# Patient Record
Sex: FEMALE | Race: WHITE
[De-identification: names, ages, dates, MRNs, and addresses within clinical notes are randomized per-mention and may not be internally consistent; named-entity substitution may affect disease eponyms.]

---

## 2018-02-19 ENCOUNTER — HOSPITAL ENCOUNTER (OUTPATIENT)
Dept: HOSPITAL 25 - OREAST | Age: 48
Discharge: HOME | End: 2018-02-19
Attending: ORTHOPAEDIC SURGERY
Payer: COMMERCIAL

## 2018-02-19 VITALS — DIASTOLIC BLOOD PRESSURE: 60 MMHG | SYSTOLIC BLOOD PRESSURE: 102 MMHG

## 2018-02-19 DIAGNOSIS — M75.01: Primary | ICD-10-CM

## 2018-02-19 DIAGNOSIS — K21.9: ICD-10-CM

## 2018-02-19 DIAGNOSIS — J45.909: ICD-10-CM

## 2018-02-19 DIAGNOSIS — E03.9: ICD-10-CM

## 2018-02-19 DIAGNOSIS — G89.18: ICD-10-CM

## 2018-02-19 PROCEDURE — 81025 URINE PREGNANCY TEST: CPT

## 2018-02-23 NOTE — OP
DATE OF OPERATION:  02/19/18 - [facility]

 

DATE OF BIRTH:  [birth year]

 

SURGEON:  Tommy Segovia MD

 

ASSISTANT:  JULIANN Acosta.  An assistant was needed for the entirety of 
the case to help with positioning, retraction, and was utilized throughout all 
portions of the case including manipulation.

 

ANESTHESIOLOGIST:  Aries Guadarrama MD

 

ANESTHESIA:  General interscalene block.

 

PRE-OP DIAGNOSIS:  Right shoulder adhesive capsulitis status post rotator cuff 
repair.

 

POST-OP DIAGNOSIS:  Right shoulder adhesive capsulitis status post rotator cuff 
repair.

 

OPERATIVE PROCEDURE:

1.  Right shoulder lysis of adhesion.

2.  Revision, decompression, bursectomy of subacromial space.

3.  Manipulation under anesthesia.

4.  Intraarticular injection with 80 mg of Depo-Medrol.

 

INDICATIONS:  Masha Diaz is a 47-year-old female, who underwent a 
rotator cuff repair on 11/21/16 along with biceps tenotomy.  She had made gains 
with her motion and then plateaued.  We tried antiinflammatories, injections, 
physical therapy to no avail.  Risks and benefits of surgery were discussed at 
length, included but not limited to, bleeding; infection; damage to nerves, 
vessels, surrounding structures; wound nonhealing; persistent pain; need for 
further surgery; scarring; stiffness; incomplete relief of symptoms; risks of 
anesthesia; fracture; risk of DVT.  She has elected to proceed.

 

COMPLICATIONS:  None.

 

ESTIMATED BLOOD LOSS:  Minimal.

 

DESCRIPTION OF PROCEDURE:  The patient was greeted in the preoperative area by 
the attending surgeon.  Correct extremity was marked, consent was confirmed.  
The patient was then brought back to the operating suite, where she was placed 
in the supine position on the operating table.  She underwent interscalene 
nerve block by the anesthesiologist, after which she underwent general 
anesthesia with endotracheal intubation, after which she was placed in left 
lateral decubitus position and all bony prominences were padded.  The right 
shoulder was draped unsterile with 10 pounds of traction.  The right shoulder 
was prepped and draped in the usual sterile fashion beginning with 
chlorhexidine soap, scrub, and alcohol wipe, and a final prep with ChloraPrep.

 

After appropriate surgical pause indicating side, site, procedure, and 
administration of antibiotics, the standard posterior lateral portal was made 
sharply with a 11 blade.  Scope was introduced into the joint.  The 
glenohumeral joint had some mild chondrosis that was present.  The superior 
labrum was intact. Anterior posterior labrum had mild fraying.  There was a 
thick shelf of tissue obscuring the supraspinatus tendon.  The interval space 
had significant amounts of thickening and capsulitis such that it was difficult 
to get the anterior portal, and once this was established though the 
electrocautery device was used to remove and release them at the interval space
, this allowed for some mobilization of the shoulder joint.  Once this was done 
with careful blunt and electrocautery dissection, the anterior capsule was 
carefully released under direct visualization. The subscap was then carefully 
mobilized.  Internal and external rotation was obtained and it was found to be 
more mobile. The subscap was identified and found to be intact.  The 
undersurface of the rotator cuff was found to be intact as well. Once the 
intraarticular portion was completed, attention was directed towards the 
subacromial space.

 

The scope was introduced into the subacromial space.  Lateral portal was made 
in an outside-in fashion.  There was abundant thick bursa that was present.  
This was removed using a shaver.  There was also evidence of adhesions in the 
subdeltoid space, particularly anteriorly and posteriorly.  These were 
carefully released with care to preserve the rotator cuff.  The sutures were 
identified.  There was no evidence of tearing. There were no loose bodies. Once 
the debridement was complete, the final images were obtained.  An 18-gauge 
needle was placed intraarticularly under arthroscopic visualization.  The 
wounds were copiously irrigated and closed with 3-0 nylon.  The joint was 
intraarticularly injected with 80 mg of Depo-Medrol.  Sterile dressings were 
applied.  At this point, the patient was kept in lateral position and gentle 
manipulation was done.  Preoperatively, her forward flexion was found to be 
about 145 with gentle manipulation and was found to get to 150 degrees forward 
flexion, abduction to 150 degrees, external rotation initially preoperatively 
was to about 10 or 15 degrees and intraoperatively, it manipulated to 70 
degrees.  Sterile dressings were applied as well as a Cryo/Cuff and a regular 
sling.  She was awoken from anesthesia and was transferred to the PACU in 
stable condition.

 

POSTOPERATIVE PLAN:  She will be nonweightbearing. She will start physical 
therapy tomorrow. She will be discharged on pain medication and antibiotics.  I 
will see the patient back in 10 to 14 days.

 

 [id number]/[facility] #: [id number]

YUMIKO

## 2019-11-05 ENCOUNTER — HOSPITAL ENCOUNTER (EMERGENCY)
Dept: HOSPITAL 25 - ED | Age: 49
Discharge: HOME | End: 2019-11-05
Payer: COMMERCIAL

## 2019-11-05 VITALS — DIASTOLIC BLOOD PRESSURE: 64 MMHG | SYSTOLIC BLOOD PRESSURE: 114 MMHG

## 2019-11-05 DIAGNOSIS — J45.909: ICD-10-CM

## 2019-11-05 DIAGNOSIS — Z79.899: ICD-10-CM

## 2019-11-05 DIAGNOSIS — K80.50: Primary | ICD-10-CM

## 2019-11-05 DIAGNOSIS — E03.9: ICD-10-CM

## 2019-11-05 DIAGNOSIS — K21.9: ICD-10-CM

## 2019-11-05 DIAGNOSIS — K44.9: ICD-10-CM

## 2019-11-05 DIAGNOSIS — R10.11: ICD-10-CM

## 2019-11-05 LAB
ALBUMIN SERPL BCG-MCNC: 4.4 G/DL (ref 3.2–5.2)
ALBUMIN/GLOB SERPL: 1.5 {RATIO} (ref 1–3)
ALP SERPL-CCNC: 90 U/L (ref 34–104)
ALT SERPL W P-5'-P-CCNC: 18 U/L (ref 7–52)
ANION GAP SERPL CALC-SCNC: 7 MMOL/L (ref 2–11)
AST SERPL-CCNC: 17 U/L (ref 13–39)
BASOPHILS # BLD AUTO: 0.1 10^3/UL (ref 0–0.2)
BUN SERPL-MCNC: 13 MG/DL (ref 6–24)
BUN/CREAT SERPL: 14.6 (ref 8–20)
CALCIUM SERPL-MCNC: 9.6 MG/DL (ref 8.6–10.3)
CHLORIDE SERPL-SCNC: 105 MMOL/L (ref 101–111)
EOSINOPHIL # BLD AUTO: 0.2 10^3/UL (ref 0–0.6)
GLOBULIN SER CALC-MCNC: 2.9 G/DL (ref 2–4)
GLUCOSE SERPL-MCNC: 93 MG/DL (ref 70–100)
HCO3 SERPL-SCNC: 26 MMOL/L (ref 22–32)
HCT VFR BLD AUTO: 39 % (ref 35–47)
HGB BLD-MCNC: 13.7 G/DL (ref 12–16)
INR PPP/BLD: 1 (ref 0.82–1.09)
LYMPHOCYTES # BLD AUTO: 1.6 10^3/UL (ref 1–4.8)
MCH RBC QN AUTO: 32 PG (ref 27–31)
MCHC RBC AUTO-ENTMCNC: 35 G/DL (ref 31–36)
MCV RBC AUTO: 90 FL (ref 80–97)
MONOCYTES # BLD AUTO: 0.4 10^3/UL (ref 0–0.8)
NEUTROPHILS # BLD AUTO: 3.4 10^3/UL (ref 1.5–7.7)
NRBC # BLD AUTO: 0 10^3/UL
NRBC BLD QL AUTO: 0.1
PLATELET # BLD AUTO: 250 10^3/UL (ref 150–450)
POTASSIUM SERPL-SCNC: 3.8 MMOL/L (ref 3.5–5)
PROT SERPL-MCNC: 7.3 G/DL (ref 6.4–8.9)
RBC # BLD AUTO: 4.32 10^6 /UL (ref 3.7–4.87)
SODIUM SERPL-SCNC: 138 MMOL/L (ref 135–145)
TROPONIN I SERPL-MCNC: 0 NG/ML (ref ?–0.04)
WBC # BLD AUTO: 5.7 10^3/UL (ref 3.5–10.8)

## 2019-11-05 PROCEDURE — 36415 COLL VENOUS BLD VENIPUNCTURE: CPT

## 2019-11-05 PROCEDURE — 83690 ASSAY OF LIPASE: CPT

## 2019-11-05 PROCEDURE — 80053 COMPREHEN METABOLIC PANEL: CPT

## 2019-11-05 PROCEDURE — 93005 ELECTROCARDIOGRAM TRACING: CPT

## 2019-11-05 PROCEDURE — 76705 ECHO EXAM OF ABDOMEN: CPT

## 2019-11-05 PROCEDURE — 86140 C-REACTIVE PROTEIN: CPT

## 2019-11-05 PROCEDURE — 99282 EMERGENCY DEPT VISIT SF MDM: CPT

## 2019-11-05 PROCEDURE — 84484 ASSAY OF TROPONIN QUANT: CPT

## 2019-11-05 PROCEDURE — 85610 PROTHROMBIN TIME: CPT

## 2019-11-05 PROCEDURE — 85025 COMPLETE CBC W/AUTO DIFF WBC: CPT

## 2019-11-05 NOTE — ED
Abdominal Pain/Female





- HPI Summary


HPI Summary: 


This patient is a 48-year-old female who presents to the ED with right upper 

quadrant pain radiating to the right shoulder into the epigastric region 2 

days.  She states she has had this 3 years ago and was diagnosed with biliary 

colic.  She had seen a surgeon as well as a GI specialist.  She opted not to 

have surgery at the time as her symptoms resolved spontaneously.  Over the past 

2 days, she has been endorsing nausea without vomiting, right upper quadrant 

pain which is worse after eating, specifically with fatty foods, resolves 

spontaneously after 1-2 hours and improves with decreased PO intake.  She 

states however she does have some pain even with water.  She continues on her 

diclofenac medication for her right shoulder and Nexium for her GERD sxs and 

she does have a history of hiatal hernia.  Recent endoscopy 3 months ago 

revealed no acute findings.  





- History of Current Complaint


Chief Complaint: EDAbdPain


Stated Complaint: ABD PAIN/BACK/CHEST PAIN PER PT


Time Seen by Provider: 11/05/19 11:30


Hx Obtained From: Patient


Hx Last Menstrual Period: Ablation, doesn't have periods


Pregnant?: No


Onset/Duration: Sudden Onset


Timing: Constant


Severity Initially: Moderate


Severity Currently: Mild


Pain Intensity: 2


Pain Scale Used: 0-10 Numeric


Location: Discrete At: RUQ


Radiates: No


Radiates to: Other - upper shoulder and epigastric region


Character: Cramping


Aggravating Factor(s): Food


Alleviating Factor(s): Spontaneous Resolution


Associated Signs and Symptoms: Negative: Diaphoresis, Fever, Cough, Chest Pain, 

Blood in Stool, Urinary Symptoms, Decreased Appetite





- Risk Factors


Ectopic Pregnancy Risk Factor: Negative


Ovarian Torsion Risk Factor: Negative


Allergies/Adverse Reactions: 


 Allergies











Allergy/AdvReac Type Severity Reaction Status Date / Time


 


gatifloxacin [From Tequin] Allergy  hypoglycemi Verified 02/19/18 12:50





   a  


 


montelukast [From Singulair] Allergy  Hives Verified 02/19/18 12:50


 


Penicillins Allergy  Tachycardia Verified 02/19/18 12:50


 


Sulfa (Sulfonamide Allergy  passed out Verified 02/19/18 12:50





Antibiotics)     


 


ENVIRONMENTAL/SEASONAL Allergy  ITCHY Uncoded 02/19/18 12:50





   WATERY  





   EYES,  





   SNEEZING,  





   CONGESTION,  





   RUNNY NOSE  


 


SOME ADHESIVE TAPE Allergy  RASH - Uncoded 02/19/18 12:50





   UNSURE  





   WHICH ONES  











Home Medications: 


 Home Medications





Cholecalciferol TAB* [Vitamin D TAB*] 1,000 unit PO DAILY 11/05/19 [History 

Confirmed 11/05/19]











PMH/Surg Hx/FS Hx/Imm Hx


Previously Healthy: Yes


Endocrine/Hematology History: Reports: Hx Thyroid Disease - THYROIDECTOMY for 

graves disease


   Denies: Hx Diabetes, Hx Systemic Lupus Erythematosus


Cardiovascular History: 


   Denies: Hx Congestive Heart Failure, Hx Hypertension, Hx Pacemaker/ICD, 

Other Cardiovascular Problems/Disorders


Respiratory History: Reports: Hx Asthma - R/T ALLERGY


   Denies: Hx Chronic Obstructive Pulmonary Disease (COPD), Other Respiratory 

Problems/Disorders


GI History: Reports: Hx Gastroesophageal Reflux Disease, Hx Hiatal Hernia


   Denies: Hx Ulcer, Other GI Disorders


 History: Reports: Hx Kidney Infection, Other  Problems/Disorders - HX OF 

BLADDER LIFT


   Denies: Hx Dialysis, Hx Renal Disease


Musculoskeletal History: Reports: Hx Tendonitis - HX OF IN RIGHT SHOULDER


   Denies: Hx Rheumatoid Arthritis


Sensory History: Reports: Hx Contacts or Glasses - glasses


   Denies: Hx Hearing Aid


Opthamlomology History: Reports: Hx Contacts or Glasses - glasses


Neurological History: Reports: Hx Migraine


   Denies: Other Neuro Impairments/Disorders


Psychiatric History: 


   Denies: Hx Panic Disorder





- Cancer History


Hx Chemotherapy: No


Hx Radiation Therapy: No





- Surgical History


Surgery Procedure, Year, and Place: 1994 CSECTION, [facility].  1997 BILATERAL TUBAL 

LIGATION, [facility] AND UTERINE ABLATION.  [place] [facility] [place] 

[place] [facility] [place]  [place]ECTOMY ( GRAVES 

DISE[place] [facility] [place]  [place], 2016[place] [facility]  2016, left knee, [facility].  2008 

EXCISION HEMANGIOMA [place] [facility] [place]  2011 DILATION, CURETTAGE, 

HYSTEROSCOPY, NOVASURE [place] [facility] [place].  2015 VAGINAL SLING, CYSTOSCOPY (BLADDER 

L[place] [facility] [place]  [place]2016 LEFT KNEE MENISCUS REPAIR, [facility].  11/2016 - Rt RCT REPAIR


Hx Anesthesia Reactions: Yes - n/v, scapalomine patch works





- Immunization History


Hx Pertussis Vaccination: No


Immunizations Up to Date: Yes


Infectious Disease History: No


Infectious Disease History: 


   Denies: Hx Hepatitis, Hx Human Immunodeficiency Virus (HIV), Traveled 

Outside the  in Last 30 Days





- Social History


Occupation: Employed Full-time


Lives: With Family


Alcohol Use: None


Alcohol Amount: 1-2 per year


Hx Substance Use: No


Substance Use Type: Reports: None


Smoking Status (MU): Never Smoked Tobacco





Review of Systems


Negative: Fever, Chills, Fatigue, Skin Diaphoresis


Negative: Palpitations, Chest Pain


Positive: Abdominal Pain - right upper quadrant


Genitourinary: Negative


Positive: no symptoms reported, see HPI


Negative: Arthralgia, Myalgia


Neurological: Negative


All Other Systems Reviewed And Are Negative: Yes





Physical Exam


Triage Information Reviewed: Yes


Vital Signs On Initial Exam: 


 Initial Vitals











Temp Pulse Resp BP Pulse Ox


 


 97.8 F   67   18   138/68   99 


 


 11/05/19 11:25  11/05/19 11:25  11/05/19 11:25  11/05/19 11:25  11/05/19 11:25











Vital Signs Reviewed: Yes


Appearance: Positive: Well-Appearing, Well-Nourished


Skin: Positive: Warm, Skin Color Reflects Adequate Perfusion


Head/Face: Positive: Normal Head/Face Inspection


Eyes: Positive: EOMI, NINI, Conjunctiva Clear


Neck: Positive: Supple, No Lymphadenopathy


Respiratory/Lung Sounds: Positive: Clear to Auscultation, Breath Sounds Present


Cardiovascular: Positive: RRR, Pulses are Symmetrical in both Upper and Lower 

Extremities


Abdomen Description: Positive: Nontender, No Organomegaly, Soft, Other: - +

muprhys sign


Bowel Sounds: Positive: Present


Musculoskeletal: Positive: Normal, Strength/ROM Intact


Neurological: Positive: Speech Normal


Psychiatric: Positive: Affect/Mood Appropriate


AVPU Assessment: Alert





Procedures





- Sedation


Patient Received Moderate/Deep Sedation with Procedure: No





Diagnostics





- Vital Signs


 Vital Signs











  Temp Pulse Resp BP Pulse Ox


 


 11/05/19 13:01  98.7 F  66  15  114/64  94


 


 11/05/19 12:11   62  14  114/64  99


 


 11/05/19 11:25  97.8 F  67  18  138/68  99














- Laboratory


Lab Results: 


 Lab Results











  11/05/19 11/05/19 11/05/19 Range/Units





  11:29 11:29 11:29 


 


WBC  5.7    (3.5-10.8)  10^3/uL


 


RBC  4.32    (3.70-4.87)  10^6 /uL


 


Hgb  13.7    (12.0-16.0)  g/dL


 


Hct  39    (35-47)  %


 


MCV  90    (80-97)  fL


 


MCH  32 H    (27-31)  pg


 


MCHC  35    (31-36)  g/dL


 


RDW  13    (10-15)  %


 


Plt Count  250    (150-450)  10^3/uL


 


MPV  8.3    (7.4-10.4)  fL


 


Neut % (Auto)  60.4    %


 


Lymph % (Auto)  27.5    %


 


Mono % (Auto)  6.5    %


 


Eos % (Auto)  4.4    %


 


Baso % (Auto)  1.2    %


 


Absolute Neuts (auto)  3.4    (1.5-7.7)  10^3/ul


 


Absolute Lymphs (auto)  1.6    (1.0-4.8)  10^3/ul


 


Absolute Monos (auto)  0.4    (0-0.8)  10^3/ul


 


Absolute Eos (auto)  0.2    (0-0.6)  10^3/ul


 


Absolute Basos (auto)  0.1    (0-0.2)  10^3/ul


 


Absolute Nucleated RBC  0.0    10^3/ul


 


Nucleated RBC %  0.1    


 


INR (Anticoag Therapy)   1.00   (0.82-1.09)  


 


Sodium    138  (135-145)  mmol/L


 


Potassium    3.8  (3.5-5.0)  mmol/L


 


Chloride    105  (101-111)  mmol/L


 


Carbon Dioxide    26  (22-32)  mmol/L


 


Anion Gap    7  (2-11)  mmol/L


 


BUN    13  (6-24)  mg/dL


 


Creatinine    0.89  (0.51-0.95)  mg/dL


 


Est GFR ( Amer)    82.3  (>60)  


 


Est GFR (Non-Af Amer)    68.0  (>60)  


 


BUN/Creatinine Ratio    14.6  (8-20)  


 


Glucose    93  ()  mg/dL


 


Calcium    9.6  (8.6-10.3)  mg/dL


 


Total Bilirubin    0.50  (0.2-1.0)  mg/dL


 


AST    17  (13-39)  U/L


 


ALT    18  (7-52)  U/L


 


Alkaline Phosphatase    90  ()  U/L


 


Troponin I    0.00  (<0.04)  ng/mL


 


C-Reactive Protein    9.14 H  (<8.01)  mg/L


 


Total Protein    7.3  (6.4-8.9)  g/dL


 


Albumin    4.4  (3.2-5.2)  g/dL


 


Globulin    2.9  (2-4)  g/dL


 


Albumin/Globulin Ratio    1.5  (1-3)  


 


Lipase    18  (11.0-82.0)  U/L











Result Diagrams: 


 11/05/19 11:29





 11/05/19 11:29


Lab Statement: Any lab studies that have been ordered have been reviewed, and 

results considered in the medical decision making process.





Abdominal Pain Fem Course/Dx





- Course


Course Of Treatment: Physical examination, patient has positive Hanna sign.  

No other pain throughout the abdomen.  No CVA tenderness bilaterally.  Pt 

states she has had this in the past and states her US was negative.  Has not 

had sxs x 3 years.  Labs obtained which are WNL.  US gallbladder: 

Cholelithiasis versus biliary sludge without sonographic signs of acute 

inflammatory change or pathologic biliary obstruction.  Possible hepatic 

steatosis.  Pt improved while in the ED and currently asymptomatic.  Patient is 

given Zofran and tramadol for at home for any symptoms.  She is encouraged to 

decrease any fat intake and eat a bland diet.  She will follow-up with surgery.

  Dx with biliary colic, although on the differential is GERD.





- Diagnoses


Differential Diagnosis: Positive: Other - biliary colic, biliary sludge, 

cholilithiasis, gallbladder dysfunction, GERD


Provider Diagnoses: 


 Biliary colic








Discharge ED





- Sign-Out/Discharge


Documenting (check all that apply): Patient Departure





- Discharge Plan


Condition: Stable


Disposition: HOME


Prescriptions: 


Ondansetron ODT TAB* [Zofran 4 MG Odt TAB*] 4 mg PO Q6H PRN #12 tab.odt MDD 4


 PRN Reason: Nausea


traMADol TAB* [Ultram*] 50 mg PO Q8H PRN #12 tab MDD 3


 PRN Reason: Pain


Patient Education Materials:  Biliary Colic (ED), Low Fat Diet (ED), 

Laparoscopic Cholecystectomy (DC)


Referrals: 


Agustin Gambino MD [Primary Care Provider] - 


Karen Henson MD [Medical Doctor] - 


Additional Instructions: 


Please follow up with surgery - call today to make an appt


Eat low fat diet and small amounts at a time


For any nausea, zofran as needed


For pain, tramadol 50mg up to three times daily as needed for pain





- Billing Disposition and Condition


Condition: STABLE


Disposition: Home





- Attestation Statements


Provider Attestation: 





 I was available for consultation for this patient. I did not evaluate the 

patient or participate in any medical decision making or disposition decisions 

unless I am specifically named in the chart as having consulted on the patient. 

If I have consulted on the patient, please see my own ED note on the patient 

encounter. Dennis Westfall MD

## 2019-12-13 ENCOUNTER — HOSPITAL ENCOUNTER (OUTPATIENT)
Dept: HOSPITAL 25 - OR | Age: 49
Discharge: HOME | End: 2019-12-13
Attending: SURGERY
Payer: COMMERCIAL

## 2019-12-13 VITALS — DIASTOLIC BLOOD PRESSURE: 62 MMHG | SYSTOLIC BLOOD PRESSURE: 110 MMHG

## 2019-12-13 DIAGNOSIS — Z88.8: ICD-10-CM

## 2019-12-13 DIAGNOSIS — K80.10: Primary | ICD-10-CM

## 2019-12-13 DIAGNOSIS — Z88.2: ICD-10-CM

## 2019-12-13 DIAGNOSIS — E89.0: ICD-10-CM

## 2019-12-13 DIAGNOSIS — R10.11: ICD-10-CM

## 2019-12-13 DIAGNOSIS — K21.9: ICD-10-CM

## 2019-12-13 DIAGNOSIS — Z88.0: ICD-10-CM

## 2019-12-13 PROCEDURE — 0FT44ZZ RESECTION OF GALLBLADDER, PERCUTANEOUS ENDOSCOPIC APPROACH: ICD-10-PCS | Performed by: SURGERY

## 2019-12-13 PROCEDURE — 88304 TISSUE EXAM BY PATHOLOGIST: CPT

## 2019-12-13 NOTE — OP
DATE OF OPERATION:  12/13/19 - [facility]

 

DATE OF BIRTH:  [birth year]

 

SURGEON:  Arthur Rain MD

 

ASSISTANT:  Harry Herman MD

 

ANESTHESIOLOGIST:  Dr. Villanueva.

 

ANESTHESIA:  General with local.

 

PRE-OP DIAGNOSES:  Right upper quadrant abdominal pain and cholelithiasis.

 

POST-OP DIAGNOSES:  Right upper quadrant abdominal pain and cholelithiasis.

 

OPERATIVE PROCEDURE:  Laparoscopic cholecystectomy.

 

ESTIMATED BLOOD LOSS:  Minimal.

 

IV FLUIDS:  1 L of crystalloid.

 

SPECIMENS:  Gallbladder.

 

DRAINS:  None.

 

COMPLICATIONS:  None.

 

WOUND CLASSIFICATION:  3.

 

FINDINGS:  No evidence of acute or chronic gallbladder inflammation, otherwise 
as above.

 

DESCRIPTION OF PROCEDURE:  Written informed consent was obtained, the abdomen 
was marked with indelible ink.  Preoperative antibiotics were not administered 
as she has multiple antibiotic allergies and the risks were felt to outweigh 
the benefits of a prophylactic dose of antibiotic.  She was taken to the 
operating room, placed in the supine position.  Sequential compression devices 
and warming blanket were applied.  General anesthesia was administered and the 
abdomen was prepped and draped in the usual sterile fashion.

 

Time-out verification was completed.

 

Initially, a small transverse incision was made just above the umbilicus in the 
midline.  The peritoneal cavity was entered under direct vision.  A 12 mm blunt 
port was inserted and the abdomen was insufflated to 15 mmHg.

 

Under direct vision, a 12-mm epigastric port was placed and two 5-mm ports were 
placed in the right side of the abdominal wall.

 

The gallbladder was identified.  It was bluish in color without wall thickening 
or pericholecystic fluid or suggestions of acute or severe chronic 
inflammation.  The liver was unremarkable.  The stomach was normal.

 

The gallbladder was grasped and elevated up over the liver bed.

 

Initially, the infundibulum was identified and the peritoneum along the medial 
and lateral aspects of the infundibulum was divided and swept downwards to 
expose the cystic duct and cystic artery as they entered the gallbladder.

 

I took a considerable portion of the inferior part of the gallbladder off the 
liver bed using a critical view technique to assure myself of the structures.

 

The cystic duct did not appear to be dilated and was of expected size.  This 
was doubly clipped and divided, and the artery was then doubly clipped and 
divided.

 

The gallbladder was removed from the liver bed using cautery and placed in 
EndoCatch bag and brought out through the epigastric incision.

 

Hemostasis was assured in the liver bed.  All ports were removed under direct 
vision of the camera.  There was a small amount of bleeding from the 12 mm 
epigastric port and a single 0 Vicryl suture was used to close the fascia using 
the Endoclose needle with good hemostasis. The umbilical fascia was closed with 
interrupted 0 Vicryl suture.  The skin was approximated with subcuticular 4-0 
Vicryl suture at all 4 incisions.  Steri-Strips were applied.  The patient 
tolerated the procedure well, was taken to the recovery room in stable 
condition.

 

 [id number]/[facility] #: [id number]

[facility] HospitalALEXIS

## 2019-12-13 NOTE — OP
Operative Report - Blank





- Operative Report


Date of Operation: 12/13/19


Note: 


OP NOTE





Pre: Gallstones, right upper abdominal pain


Post: Same


Procedure; Laparoscopic cholecystectomy


Surgeon: MD Briseyda


Asst; MD Virgil


Anes: General with local, Rich Lala


EBL: min


IVF one liter of saline


Wound:2


Drain: none


Specimen: gallbladder


Complication: None


Findings: Above-no acute or chronic inflammation